# Patient Record
Sex: MALE | Race: WHITE | NOT HISPANIC OR LATINO
[De-identification: names, ages, dates, MRNs, and addresses within clinical notes are randomized per-mention and may not be internally consistent; named-entity substitution may affect disease eponyms.]

---

## 2023-11-15 ENCOUNTER — APPOINTMENT (OUTPATIENT)
Age: 73
End: 2023-11-15
Payer: MEDICARE

## 2023-11-15 VITALS
DIASTOLIC BLOOD PRESSURE: 88 MMHG | TEMPERATURE: 98 F | OXYGEN SATURATION: 96 % | SYSTOLIC BLOOD PRESSURE: 148 MMHG | HEART RATE: 65 BPM | HEIGHT: 66 IN | WEIGHT: 184 LBS | BODY MASS INDEX: 29.57 KG/M2

## 2023-11-15 DIAGNOSIS — J45.901 UNSPECIFIED ASTHMA WITH (ACUTE) EXACERBATION: ICD-10-CM

## 2023-11-15 DIAGNOSIS — I10 ESSENTIAL (PRIMARY) HYPERTENSION: ICD-10-CM

## 2023-11-15 DIAGNOSIS — D75.9 DISEASE OF BLOOD AND BLOOD-FORMING ORGANS, UNSPECIFIED: ICD-10-CM

## 2023-11-15 PROBLEM — Z00.00 ENCOUNTER FOR PREVENTIVE HEALTH EXAMINATION: Status: ACTIVE | Noted: 2023-11-15

## 2023-11-15 PROCEDURE — 99205 OFFICE O/P NEW HI 60 MIN: CPT

## 2023-11-16 PROBLEM — I10 HYPERTENSION, UNSPECIFIED TYPE: Status: ACTIVE | Noted: 2023-11-15

## 2024-03-20 ENCOUNTER — APPOINTMENT (OUTPATIENT)
Age: 74
End: 2024-03-20
Payer: MEDICARE

## 2024-03-20 DIAGNOSIS — J45.20 MILD INTERMITTENT ASTHMA, UNCOMPLICATED: ICD-10-CM

## 2024-03-20 DIAGNOSIS — D61.2: ICD-10-CM

## 2024-03-20 DIAGNOSIS — I10 ESSENTIAL (PRIMARY) HYPERTENSION: ICD-10-CM

## 2024-03-20 DIAGNOSIS — Z57.9 MILD INTERMITTENT ASTHMA, UNCOMPLICATED: ICD-10-CM

## 2024-03-20 PROCEDURE — 99443: CPT | Mod: 93

## 2024-03-20 SDOH — HEALTH STABILITY - PHYSICAL HEALTH: OCCUPATIONAL EXPOSURE TO UNSPECIFIED RISK FACTOR: Z57.9

## 2024-03-21 PROBLEM — J45.20: Status: ACTIVE | Noted: 2023-11-16

## 2024-03-21 PROBLEM — I10 HYPERTENSION, UNSPECIFIED TYPE: Status: ACTIVE | Noted: 2023-11-16

## 2024-03-21 PROBLEM — D61.2: Status: ACTIVE | Noted: 2023-11-16

## 2024-03-21 NOTE — HISTORY OF PRESENT ILLNESS
[FreeTextEntry1] : We began this TELEPHONIC visit at 1:18 pm after the patient noted his preference for a telephonic encounter instead of the audio-video. We ended at 2:34 pm.   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  And, he notes, his treating doctors are "thrilled".  He notes that in his view the remaining serious issue is the mystery of "what happened to me" and "I would like to find out exactly what it was that caused this chemical damage and for the time being I'm working right through it in the meantime".  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 "I can't stay away from petroleum products--you know they're everywhere..."  "If I can't figure out exactly what it was, I will have to stay away from exposure to any petroleum products"  "My hematologist saved my life," but he is not interested as much in what caused this and treated this as an auto immune process solely.  Though he did indeed find an article about the role of environmental triggering factors, namely specific petroleum distillates.  His PCP thought that the chemical exposure destroyed his bone marrow and had ordered a second more involved bone marrow aspiration for testing, but nothing from those tests explained what was transpiring.  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  Mr. Faustin asks, could he be more sensitive to petroleum products now after this exposure and disease?  We discussed practical measures to allow him to continue working as a  and also proactive measures to protect his health on an ongoing basis and how to balance these two.    Mr. Faustin explained that he recently smelled one drop of paint thinner and he right away developed congestion, runny nose.  He notes one of his symptoms before he was diagnosed with his blood disease was asthma and allergic reactions to the materials he worked with he states. Several months ago, he also had a built in varnished wood frame and he had put a scratch on it inadvertently and he went to get varnish to touch it up and he had a huge reaction--he couldn't breathe and had wheezing and chest pressure; he closed the can after exposure to it for about 10 seconds, from only one breath of the varnish vapor.  These symptoms dissipated after about 5 minutes.  He was seeing an asthma specialist before he was diagnosed with his blood disease; and the MD had recommended exhaust ventilation and wearing a respirator but continued using the petroleum based paints.  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 He saw Dr. 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  He also developed terrible acid reflux symptoms.  He is taking Prilosec which seems to be helping. When he eats any food it triggers these symptoms.  This was a far worse problem right before he was diagnosed with his blood disease.   He was getting symptoms of easy bruising on his arms for a while before he was diagnosed with his blood disease.  He notes that by chance he had a physical exam which included a blood test right before he was to go on a trip and his PCP called him to advise that he sees a hematologist immediately.  He notes that when his blood counts were at "rock bottom" in other words levels that would indicate a diagnosis of aplastic anemia, his hematologist told him he suspected his blood disease was auto immune and based on a paper he read that it could be triggered by his exposure to chemical(s) the pt used in his painting.  Mr. Faustin wonders how long his blood disease was "brewing" inside before it decreased his blood counts to the levels he had when first diagnosed.    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 "I was never 'paranoid' about working with the substances I used"  "Once you get used to it, there's no substitute for leaded paint"   He notes he always knew that benzene is a carcinogen and he hoped that a specific product could be found and that he could then use other petroleum products.  We discussed lead exposure and he will send me the results of his blood tests.    Plan 1). I will contact pt's PCP Dr. Marilee Correia [place] at [address] [phone] to provide feedback regarding the pt's evaluation and my recommendations as per pt's request 2).